# Patient Record
Sex: FEMALE | Race: WHITE | Employment: UNEMPLOYED | URBAN - METROPOLITAN AREA
[De-identification: names, ages, dates, MRNs, and addresses within clinical notes are randomized per-mention and may not be internally consistent; named-entity substitution may affect disease eponyms.]

---

## 2018-10-31 LAB
C. TRACHOMATIS, EXTERNAL RESULT: NEGATIVE
N. GONORRHOEAE, EXTERNAL RESULT: NEGATIVE

## 2018-12-12 LAB
ABO, EXTERNAL RESULT: NORMAL
HEP B, EXTERNAL RESULT: NEGATIVE
HIV, EXTERNAL RESULT: NORMAL
RHOGAM, EXTERNAL RESULT: POSITIVE
RPR, EXTERNAL RESULT: NORMAL
RUBELLA TITER, EXTERNAL RESULT: NORMAL

## 2019-06-06 LAB — GBS, EXTERNAL RESULT: NEGATIVE

## 2019-06-27 ENCOUNTER — ANESTHESIA EVENT (OUTPATIENT)
Dept: LABOR AND DELIVERY | Age: 34
End: 2019-06-27
Payer: COMMERCIAL

## 2019-06-27 ENCOUNTER — HOSPITAL ENCOUNTER (INPATIENT)
Age: 34
LOS: 2 days | Discharge: HOME | End: 2019-06-29
Attending: OBSTETRICS & GYNECOLOGY | Admitting: OBSTETRICS & GYNECOLOGY
Payer: COMMERCIAL

## 2019-06-27 ENCOUNTER — ANESTHESIA (OUTPATIENT)
Dept: LABOR AND DELIVERY | Age: 34
End: 2019-06-27
Payer: COMMERCIAL

## 2019-06-27 VITALS — DIASTOLIC BLOOD PRESSURE: 60 MMHG | SYSTOLIC BLOOD PRESSURE: 105 MMHG | OXYGEN SATURATION: 100 %

## 2019-06-27 LAB
ABO/RH: NORMAL
AMPHETAMINE SCREEN, URINE: NORMAL
ANTIBODY SCREEN: NORMAL
BARBITURATE SCREEN URINE: NORMAL
BASOPHILS ABSOLUTE: 0 K/UL (ref 0–0.2)
BASOPHILS RELATIVE PERCENT: 0.4 %
BENZODIAZEPINE SCREEN, URINE: NORMAL
BUPRENORPHINE URINE: NORMAL
CANNABINOID SCREEN URINE: NORMAL
COCAINE METABOLITE SCREEN URINE: NORMAL
EOSINOPHILS ABSOLUTE: 0.1 K/UL (ref 0–0.6)
EOSINOPHILS RELATIVE PERCENT: 1.5 %
HCT VFR BLD CALC: 38.4 % (ref 36–48)
HEMOGLOBIN: 13.2 G/DL (ref 12–16)
LYMPHOCYTES ABSOLUTE: 1.6 K/UL (ref 1–5.1)
LYMPHOCYTES RELATIVE PERCENT: 16.8 %
Lab: NORMAL
MCH RBC QN AUTO: 28.4 PG (ref 26–34)
MCHC RBC AUTO-ENTMCNC: 34.2 G/DL (ref 31–36)
MCV RBC AUTO: 83 FL (ref 80–100)
METHADONE SCREEN, URINE: NORMAL
MONOCYTES ABSOLUTE: 0.6 K/UL (ref 0–1.3)
MONOCYTES RELATIVE PERCENT: 6.7 %
NEUTROPHILS ABSOLUTE: 6.9 K/UL (ref 1.7–7.7)
NEUTROPHILS RELATIVE PERCENT: 74.6 %
OPIATE SCREEN URINE: NORMAL
OXYCODONE URINE: NORMAL
PDW BLD-RTO: 15.1 % (ref 12.4–15.4)
PH UA: 7
PHENCYCLIDINE SCREEN URINE: NORMAL
PLATELET # BLD: 209 K/UL (ref 135–450)
PMV BLD AUTO: 8.2 FL (ref 5–10.5)
PROPOXYPHENE SCREEN: NORMAL
RBC # BLD: 4.63 M/UL (ref 4–5.2)
TOTAL SYPHILLIS IGG/IGM: NORMAL
WBC # BLD: 9.3 K/UL (ref 4–11)

## 2019-06-27 PROCEDURE — 6360000002 HC RX W HCPCS: Performed by: NURSE ANESTHETIST, CERTIFIED REGISTERED

## 2019-06-27 PROCEDURE — 6360000002 HC RX W HCPCS: Performed by: OBSTETRICS & GYNECOLOGY

## 2019-06-27 PROCEDURE — 86901 BLOOD TYPING SEROLOGIC RH(D): CPT

## 2019-06-27 PROCEDURE — 2580000003 HC RX 258: Performed by: OBSTETRICS & GYNECOLOGY

## 2019-06-27 PROCEDURE — 3700000001 HC ADD 15 MINUTES (ANESTHESIA): Performed by: OBSTETRICS & GYNECOLOGY

## 2019-06-27 PROCEDURE — 80307 DRUG TEST PRSMV CHEM ANLYZR: CPT

## 2019-06-27 PROCEDURE — 86780 TREPONEMA PALLIDUM: CPT

## 2019-06-27 PROCEDURE — 86850 RBC ANTIBODY SCREEN: CPT

## 2019-06-27 PROCEDURE — 1220000000 HC SEMI PRIVATE OB R&B

## 2019-06-27 PROCEDURE — 2500000003 HC RX 250 WO HCPCS: Performed by: NURSE ANESTHETIST, CERTIFIED REGISTERED

## 2019-06-27 PROCEDURE — 7100000001 HC PACU RECOVERY - ADDTL 15 MIN: Performed by: OBSTETRICS & GYNECOLOGY

## 2019-06-27 PROCEDURE — 3700000000 HC ANESTHESIA ATTENDED CARE: Performed by: OBSTETRICS & GYNECOLOGY

## 2019-06-27 PROCEDURE — 86900 BLOOD TYPING SEROLOGIC ABO: CPT

## 2019-06-27 PROCEDURE — 7100000000 HC PACU RECOVERY - FIRST 15 MIN: Performed by: OBSTETRICS & GYNECOLOGY

## 2019-06-27 PROCEDURE — 85025 COMPLETE CBC W/AUTO DIFF WBC: CPT

## 2019-06-27 PROCEDURE — 2709999900 HC NON-CHARGEABLE SUPPLY: Performed by: OBSTETRICS & GYNECOLOGY

## 2019-06-27 PROCEDURE — 3609079900 HC CESAREAN SECTION: Performed by: OBSTETRICS & GYNECOLOGY

## 2019-06-27 RX ORDER — LANOLIN 100 %
OINTMENT (GRAM) TOPICAL
Status: DISCONTINUED | OUTPATIENT
Start: 2019-06-27 | End: 2019-06-29 | Stop reason: HOSPADM

## 2019-06-27 RX ORDER — IBUPROFEN 800 MG/1
800 TABLET ORAL EVERY 6 HOURS PRN
Status: DISCONTINUED | OUTPATIENT
Start: 2019-06-27 | End: 2019-06-29 | Stop reason: HOSPADM

## 2019-06-27 RX ORDER — MORPHINE SULFATE 10 MG/ML
INJECTION, SOLUTION INTRAMUSCULAR; INTRAVENOUS PRN
Status: DISCONTINUED | OUTPATIENT
Start: 2019-06-27 | End: 2019-06-27 | Stop reason: SDUPTHER

## 2019-06-27 RX ORDER — DOCUSATE SODIUM 100 MG/1
100 CAPSULE, LIQUID FILLED ORAL 2 TIMES DAILY PRN
Status: DISCONTINUED | OUTPATIENT
Start: 2019-06-27 | End: 2019-06-29 | Stop reason: HOSPADM

## 2019-06-27 RX ORDER — PRENATAL WITH FERROUS FUM AND FOLIC ACID 3080; 920; 120; 400; 22; 1.84; 3; 20; 10; 1; 12; 200; 27; 25; 2 [IU]/1; [IU]/1; MG/1; [IU]/1; MG/1; MG/1; MG/1; MG/1; MG/1; MG/1; UG/1; MG/1; MG/1; MG/1; MG/1
1 TABLET ORAL DAILY
Status: DISCONTINUED | OUTPATIENT
Start: 2019-06-27 | End: 2019-06-29 | Stop reason: HOSPADM

## 2019-06-27 RX ORDER — MORPHINE SULFATE 0.5 MG/ML
INJECTION, SOLUTION EPIDURAL; INTRATHECAL; INTRAVENOUS
Status: DISCONTINUED
Start: 2019-06-27 | End: 2019-06-27

## 2019-06-27 RX ORDER — ONDANSETRON 2 MG/ML
INJECTION INTRAMUSCULAR; INTRAVENOUS
Status: COMPLETED
Start: 2019-06-27 | End: 2019-06-27

## 2019-06-27 RX ORDER — SIMETHICONE 80 MG
80 TABLET,CHEWABLE ORAL EVERY 6 HOURS PRN
Status: DISCONTINUED | OUTPATIENT
Start: 2019-06-27 | End: 2019-06-29 | Stop reason: HOSPADM

## 2019-06-27 RX ORDER — ACETAMINOPHEN 325 MG/1
650 TABLET ORAL EVERY 4 HOURS PRN
Status: DISCONTINUED | OUTPATIENT
Start: 2019-06-27 | End: 2019-06-29 | Stop reason: HOSPADM

## 2019-06-27 RX ORDER — ONDANSETRON 2 MG/ML
INJECTION INTRAMUSCULAR; INTRAVENOUS PRN
Status: DISCONTINUED | OUTPATIENT
Start: 2019-06-27 | End: 2019-06-27 | Stop reason: SDUPTHER

## 2019-06-27 RX ORDER — OXYTOCIN 10 [USP'U]/ML
INJECTION, SOLUTION INTRAMUSCULAR; INTRAVENOUS PRN
Status: DISCONTINUED | OUTPATIENT
Start: 2019-06-27 | End: 2019-06-27 | Stop reason: SDUPTHER

## 2019-06-27 RX ORDER — CEFAZOLIN SODIUM 1 G/3ML
INJECTION, POWDER, FOR SOLUTION INTRAMUSCULAR; INTRAVENOUS PRN
Status: DISCONTINUED | OUTPATIENT
Start: 2019-06-27 | End: 2019-06-27 | Stop reason: SDUPTHER

## 2019-06-27 RX ORDER — SODIUM CHLORIDE 0.9 % (FLUSH) 0.9 %
10 SYRINGE (ML) INJECTION EVERY 12 HOURS SCHEDULED
Status: DISCONTINUED | OUTPATIENT
Start: 2019-06-27 | End: 2019-06-29 | Stop reason: HOSPADM

## 2019-06-27 RX ORDER — METHYLERGONOVINE MALEATE 0.2 MG/ML
200 INJECTION INTRAVENOUS PRN
Status: DISCONTINUED | OUTPATIENT
Start: 2019-06-27 | End: 2019-06-29 | Stop reason: HOSPADM

## 2019-06-27 RX ORDER — DIPHENHYDRAMINE HYDROCHLORIDE 50 MG/ML
25 INJECTION INTRAMUSCULAR; INTRAVENOUS EVERY 6 HOURS PRN
Status: DISCONTINUED | OUTPATIENT
Start: 2019-06-27 | End: 2019-06-29 | Stop reason: HOSPADM

## 2019-06-27 RX ORDER — OXYCODONE HYDROCHLORIDE AND ACETAMINOPHEN 5; 325 MG/1; MG/1
1 TABLET ORAL EVERY 4 HOURS PRN
Status: DISCONTINUED | OUTPATIENT
Start: 2019-06-27 | End: 2019-06-29 | Stop reason: HOSPADM

## 2019-06-27 RX ORDER — SODIUM CHLORIDE, SODIUM LACTATE, POTASSIUM CHLORIDE, AND CALCIUM CHLORIDE .6; .31; .03; .02 G/100ML; G/100ML; G/100ML; G/100ML
1000 INJECTION, SOLUTION INTRAVENOUS ONCE
Status: COMPLETED | OUTPATIENT
Start: 2019-06-27 | End: 2019-06-27

## 2019-06-27 RX ORDER — OXYCODONE HYDROCHLORIDE AND ACETAMINOPHEN 5; 325 MG/1; MG/1
2 TABLET ORAL EVERY 4 HOURS PRN
Status: DISCONTINUED | OUTPATIENT
Start: 2019-06-27 | End: 2019-06-29 | Stop reason: HOSPADM

## 2019-06-27 RX ORDER — SODIUM CHLORIDE 0.9 % (FLUSH) 0.9 %
10 SYRINGE (ML) INJECTION PRN
Status: DISCONTINUED | OUTPATIENT
Start: 2019-06-27 | End: 2019-06-29 | Stop reason: HOSPADM

## 2019-06-27 RX ORDER — KETOROLAC TROMETHAMINE 30 MG/ML
30 INJECTION, SOLUTION INTRAMUSCULAR; INTRAVENOUS EVERY 6 HOURS
Status: DISPENSED | OUTPATIENT
Start: 2019-06-27 | End: 2019-06-28

## 2019-06-27 RX ORDER — FERROUS SULFATE 325(65) MG
325 TABLET ORAL 2 TIMES DAILY WITH MEALS
Status: DISCONTINUED | OUTPATIENT
Start: 2019-06-27 | End: 2019-06-29 | Stop reason: HOSPADM

## 2019-06-27 RX ORDER — ONDANSETRON 2 MG/ML
4 INJECTION INTRAMUSCULAR; INTRAVENOUS EVERY 6 HOURS PRN
Status: DISCONTINUED | OUTPATIENT
Start: 2019-06-27 | End: 2019-06-29 | Stop reason: HOSPADM

## 2019-06-27 RX ORDER — SODIUM CHLORIDE, SODIUM LACTATE, POTASSIUM CHLORIDE, CALCIUM CHLORIDE 600; 310; 30; 20 MG/100ML; MG/100ML; MG/100ML; MG/100ML
INJECTION, SOLUTION INTRAVENOUS CONTINUOUS
Status: DISCONTINUED | OUTPATIENT
Start: 2019-06-27 | End: 2019-06-29 | Stop reason: HOSPADM

## 2019-06-27 RX ORDER — EPHEDRINE SULFATE 50 MG/ML
INJECTION, SOLUTION INTRAVENOUS PRN
Status: DISCONTINUED | OUTPATIENT
Start: 2019-06-27 | End: 2019-06-27 | Stop reason: SDUPTHER

## 2019-06-27 RX ADMIN — SODIUM CHLORIDE, POTASSIUM CHLORIDE, SODIUM LACTATE AND CALCIUM CHLORIDE: 600; 310; 30; 20 INJECTION, SOLUTION INTRAVENOUS at 10:50

## 2019-06-27 RX ADMIN — FAMOTIDINE 20 MG: 10 INJECTION, SOLUTION INTRAVENOUS at 10:00

## 2019-06-27 RX ADMIN — OXYTOCIN 20 UNITS: 10 INJECTION INTRAVENOUS at 11:29

## 2019-06-27 RX ADMIN — SODIUM CHLORIDE, POTASSIUM CHLORIDE, SODIUM LACTATE AND CALCIUM CHLORIDE: 600; 310; 30; 20 INJECTION, SOLUTION INTRAVENOUS at 09:00

## 2019-06-27 RX ADMIN — Medication 2 G: at 10:45

## 2019-06-27 RX ADMIN — SODIUM CHLORIDE, POTASSIUM CHLORIDE, SODIUM LACTATE AND CALCIUM CHLORIDE 1000 ML: 600; 310; 30; 20 INJECTION, SOLUTION INTRAVENOUS at 10:46

## 2019-06-27 RX ADMIN — ONDANSETRON 4 MG: 2 INJECTION INTRAMUSCULAR; INTRAVENOUS at 10:00

## 2019-06-27 RX ADMIN — CEFAZOLIN 2000 MG: 1 INJECTION, POWDER, FOR SOLUTION INTRAMUSCULAR; INTRAVENOUS at 11:03

## 2019-06-27 RX ADMIN — EPHEDRINE SULFATE 10 MG: 50 INJECTION INTRAMUSCULAR; INTRAVENOUS; SUBCUTANEOUS at 11:20

## 2019-06-27 RX ADMIN — SODIUM CHLORIDE, POTASSIUM CHLORIDE, SODIUM LACTATE AND CALCIUM CHLORIDE: 600; 310; 30; 20 INJECTION, SOLUTION INTRAVENOUS at 11:21

## 2019-06-27 RX ADMIN — MORPHINE SULFATE 0.15 MG: 10 INJECTION, SOLUTION INTRAMUSCULAR; INTRAVENOUS at 11:05

## 2019-06-27 ASSESSMENT — PULMONARY FUNCTION TESTS
PIF_VALUE: 0

## 2019-06-27 ASSESSMENT — PAIN - FUNCTIONAL ASSESSMENT: PAIN_FUNCTIONAL_ASSESSMENT: 0-10

## 2019-06-27 NOTE — ANESTHESIA PRE PROCEDURE
injection    PRN Cha ANAT Lama - CRNA   20 Units at 06/27/19 1129       Allergies: Allergies   Allergen Reactions    Penicillins Other (See Comments)     Patient's sister is anaphylactic to PCN ATBs, Pt has never taken any out of fear. Problem List:  There is no problem list on file for this patient. Past Medical History:  History reviewed. No pertinent past medical history. Past Surgical History:        Procedure Laterality Date    APPENDECTOMY      BACK SURGERY      CHOLECYSTECTOMY      FRACTURE SURGERY         Social History:    Social History     Tobacco Use    Smoking status: Never Smoker    Smokeless tobacco: Never Used   Substance Use Topics    Alcohol use: No                                Counseling given: Not Answered      Vital Signs (Current):   Vitals:    06/27/19 0916 06/27/19 1005 06/27/19 1015   BP: 116/66 116/66    Pulse: 81 81    Resp: 16     Temp: 36.8 °C (98.3 °F)     TempSrc: Oral     Weight:   150 lb (68 kg)   Height:   5' 3\" (1.6 m)                                              BP Readings from Last 3 Encounters:   06/27/19 116/66   06/27/19 (!) 107/57       NPO Status: Time of last liquid consumption: 2300                        Time of last solid consumption: 2300                        Date of last liquid consumption: 06/26/19                        Date of last solid food consumption: 06/26/19    BMI:   Wt Readings from Last 3 Encounters:   06/27/19 150 lb (68 kg)     Body mass index is 26.57 kg/m².     CBC:   Lab Results   Component Value Date    WBC 9.3 06/27/2019    RBC 4.63 06/27/2019    HGB 13.2 06/27/2019    HCT 38.4 06/27/2019    MCV 83.0 06/27/2019    RDW 15.1 06/27/2019     06/27/2019       CMP:   Lab Results   Component Value Date     06/01/2015    K 3.2 06/01/2015    CL 98 06/01/2015    CO2 22 06/01/2015    BUN 12 06/01/2015    CREATININE 0.8 06/01/2015    GFRAA >60 06/01/2015    AGRATIO 1.4 06/01/2015    LABGLOM >60 06/01/2015    GLUCOSE

## 2019-06-28 LAB
HCT VFR BLD CALC: 32.7 % (ref 36–48)
HEMOGLOBIN: 10.9 G/DL (ref 12–16)
MCH RBC QN AUTO: 28.2 PG (ref 26–34)
MCHC RBC AUTO-ENTMCNC: 33.5 G/DL (ref 31–36)
MCV RBC AUTO: 84.1 FL (ref 80–100)
PDW BLD-RTO: 14.6 % (ref 12.4–15.4)
PLATELET # BLD: 185 K/UL (ref 135–450)
PMV BLD AUTO: 7.5 FL (ref 5–10.5)
RBC # BLD: 3.88 M/UL (ref 4–5.2)
WBC # BLD: 10.1 K/UL (ref 4–11)

## 2019-06-28 PROCEDURE — 36415 COLL VENOUS BLD VENIPUNCTURE: CPT

## 2019-06-28 PROCEDURE — 85027 COMPLETE CBC AUTOMATED: CPT

## 2019-06-28 PROCEDURE — 1220000000 HC SEMI PRIVATE OB R&B

## 2019-06-28 PROCEDURE — 2580000003 HC RX 258: Performed by: OBSTETRICS & GYNECOLOGY

## 2019-06-28 PROCEDURE — 6370000000 HC RX 637 (ALT 250 FOR IP): Performed by: OBSTETRICS & GYNECOLOGY

## 2019-06-28 RX ADMIN — IBUPROFEN 800 MG: 800 TABLET, FILM COATED ORAL at 02:24

## 2019-06-28 RX ADMIN — Medication 10 ML: at 09:11

## 2019-06-28 RX ADMIN — ACETAMINOPHEN 650 MG: 325 TABLET ORAL at 14:25

## 2019-06-28 RX ADMIN — Medication 10 ML: at 02:25

## 2019-06-28 RX ADMIN — Medication 1 TABLET: at 09:11

## 2019-06-28 RX ADMIN — IBUPROFEN 800 MG: 800 TABLET, FILM COATED ORAL at 09:11

## 2019-06-28 RX ADMIN — SIMETHICONE 80 MG: 80 TABLET, CHEWABLE ORAL at 02:37

## 2019-06-28 RX ADMIN — DOCUSATE SODIUM 100 MG: 100 CAPSULE, LIQUID FILLED ORAL at 02:24

## 2019-06-28 RX ADMIN — IBUPROFEN 800 MG: 800 TABLET, FILM COATED ORAL at 18:51

## 2019-06-28 RX ADMIN — SIMETHICONE 80 MG: 80 TABLET, CHEWABLE ORAL at 19:19

## 2019-06-28 ASSESSMENT — PAIN SCALES - GENERAL
PAINLEVEL_OUTOF10: 3
PAINLEVEL_OUTOF10: 5
PAINLEVEL_OUTOF10: 5
PAINLEVEL_OUTOF10: 4

## 2019-06-28 NOTE — LACTATION NOTE
This note was copied from a baby's chart. Introduced self as lactation RN for this shift. Name and number written on board. Mother concerned that she is not able to hand express drops of colostrum from breast. Mother breast fed her first child without any complications. Reassured mother that colostrum is thick and that it is sometimes difficult to hand express but infant is able to draw it out easier. Mother reassured. No other questions or concerns at this time. Encouraged to call prn for f/u.

## 2019-06-28 NOTE — PROGRESS NOTES
Subjective:     Postpartum Day 1:  Delivery    The patient feels well. The patient denies emotional concerns. Pain is well controlled with current medications. The baby iswell. Baby is feeding via breast. Urinary output is adequate. The patient is ambulating well. The patient is tolerating a normal diet. Objective:    VITALS:  BP (!) 95/55   Pulse 81   Temp 98.4 °F (36.9 °C) (Oral)   Resp 16   Ht 5' 3\" (1.6 m)   Wt 150 lb (68 kg)   LMP  (Approximate)   SpO2 98%   Breastfeeding? Unknown   BMI 26.57 kg/m²     Vitals:    19 0911   BP: (!) 95/55   Pulse: 81   Resp: 16   Temp: 98.4 °F (36.9 °C)   SpO2:          General:    alert, appears stated age and cooperative   Bowel Sounds:  active   Lochia:  appropriate   Uterine Fundus:   firm   Incision: Dressing in place , dry    DVT Evaluation:  No evidence of DVT seen on physical exam.     CBC   Lab Results   Component Value Date    WBC 10.1 2019    HGB 10.9 (L) 2019    HCT 32.7 (L) 2019    MCV 84.1 2019     2019        Assessment:     Status post  section. Doing well postoperatively. Plan:     Continue current care.     Chrissy Be MD

## 2019-06-28 NOTE — LACTATION NOTE
This note was copied from a baby's chart. Lactation Progress Note      Data:   Pt called requesting lactation to assess latch. Action: Infant STS with mom in cross cradle position and mother independently latched infant on to breast. Latch appeared to be slightly shallow and chin gently pulled down as infant breast fed. Mother states latch feels better and observed ALEXANDRA, SRS and many AS. Encouraged mother to gently pull chin down or have , if latch does not feel deep and hurts. Clicking noted intermittently with feed. Encouraged deeper latch to help with clicking as well. Response: Pleased with feeding. Encouraged to call prn for f/u.

## 2019-06-29 VITALS
WEIGHT: 150 LBS | RESPIRATION RATE: 18 BRPM | TEMPERATURE: 97.8 F | OXYGEN SATURATION: 98 % | SYSTOLIC BLOOD PRESSURE: 104 MMHG | DIASTOLIC BLOOD PRESSURE: 54 MMHG | HEIGHT: 63 IN | BODY MASS INDEX: 26.58 KG/M2 | HEART RATE: 69 BPM

## 2019-06-29 PROCEDURE — 2580000003 HC RX 258: Performed by: OBSTETRICS & GYNECOLOGY

## 2019-06-29 PROCEDURE — 6370000000 HC RX 637 (ALT 250 FOR IP): Performed by: OBSTETRICS & GYNECOLOGY

## 2019-06-29 RX ORDER — OXYCODONE HYDROCHLORIDE AND ACETAMINOPHEN 5; 325 MG/1; MG/1
1 TABLET ORAL EVERY 4 HOURS PRN
Qty: 8 TABLET | Refills: 0 | Status: SHIPPED | OUTPATIENT
Start: 2019-06-29 | End: 2019-07-02

## 2019-06-29 RX ORDER — IBUPROFEN 800 MG/1
800 TABLET ORAL EVERY 8 HOURS PRN
Qty: 25 TABLET | Refills: 1 | Status: SHIPPED | OUTPATIENT
Start: 2019-06-29

## 2019-06-29 RX ADMIN — Medication 10 ML: at 00:04

## 2019-06-29 RX ADMIN — IBUPROFEN 800 MG: 800 TABLET, FILM COATED ORAL at 00:04

## 2019-06-29 RX ADMIN — DOCUSATE SODIUM 100 MG: 100 CAPSULE, LIQUID FILLED ORAL at 00:04

## 2019-06-29 RX ADMIN — IBUPROFEN 800 MG: 800 TABLET, FILM COATED ORAL at 08:06

## 2019-06-29 RX ADMIN — DOCUSATE SODIUM 100 MG: 100 CAPSULE, LIQUID FILLED ORAL at 08:06

## 2019-06-29 ASSESSMENT — PAIN SCALES - GENERAL
PAINLEVEL_OUTOF10: 5
PAINLEVEL_OUTOF10: 5

## 2019-06-29 NOTE — FLOWSHEET NOTE
Postpartum and infant care teaching completed and forms signed by patient. Copy witnessed by RN and given to patient. Patient verbalized understanding of all teaching points and denies further questions. Prescriptions from Dr. Luz Espinosa  given. Patient plans to follow-up with [facility] Provider as instructed. ID bands checked. Mother's ID band and one of baby's ID bands removed and taped to discharge instruction sheet, signed by patient and witnessed by RN. Patient discharged home in stable condition accompanied by fob. Discharged via wheelchair, holding baby in a rear facing car seat.

## 2019-06-29 NOTE — FLOWSHEET NOTE
Discharge Phone Call Log  Patient Name: Tuyet Arechiga     [facility] Care Provider: John Madrigal MD Discharge Date: 2019    Disposition of baby:    Phone Number: [phone] (home)     Attempts to Contact:  Date:    Nurse  Date:    Nurse  Date:    Nurse    Introduce yourself and explain the questionnaire. 1.  Now that you are at home is your pain being well controlled? Y/N   What pain reducing measures are you using? ____________________________________        Information for the patient's :  Elizabeth Yoon [[id number]]   Delivery Method: , Low Transverse    2. Are you having any infant feeding issues? Y/N _____________________________      If breastfeeding, were you satisfied with the breastfeeding support services offered? Y/N  3. Any engorgement problems? Y/N  Have you had to supplement? Y/N  4. Have you made or have you already had your first appointment with the baby's doctor? Y/N If no, do you know when to schedule it? Y/N Do you have a safe crib, bassinet or play yard with a firm mattress for your infant to sleep in at home? Y/N  5. Have you scheduled your follow-up appointment? Y/N  If no, do you know when to schedule it? Y/N  6. Did your nurses and physicians include you in the plan of care, communicating with      you respectfully, and in a manner easy to understand? Y/N       Comments:  ___________________________________________________________  7. Is there anyone in particular you would like to mention who provided care for you?          ____________________________    8. Do you have any questions about your discharge instructions or the notebook? Y/N    9. Did your discharge occur in a timely manner? Y/N        Comments: __________________________________________________________    Remember to describe the hospital survey and ask patient to return it when possible.   Questions During Interview:__________________________________________________________  Teaching

## 2021-03-30 ENCOUNTER — IMMUNIZATION (OUTPATIENT)
Dept: PRIMARY CARE CLINIC | Age: 36
End: 2021-03-30
Payer: COMMERCIAL

## 2021-03-30 PROCEDURE — 0001A COVID-19, PFIZER VACCINE 30MCG/0.3ML DOSE: CPT | Performed by: FAMILY MEDICINE

## 2021-03-30 PROCEDURE — 91300 COVID-19, PFIZER VACCINE 30MCG/0.3ML DOSE: CPT | Performed by: FAMILY MEDICINE

## 2021-04-20 ENCOUNTER — IMMUNIZATION (OUTPATIENT)
Dept: PRIMARY CARE CLINIC | Age: 36
End: 2021-04-20
Payer: COMMERCIAL

## 2021-04-20 PROCEDURE — 91300 COVID-19, PFIZER VACCINE 30MCG/0.3ML DOSE: CPT | Performed by: FAMILY MEDICINE

## 2021-04-20 PROCEDURE — 0002A COVID-19, PFIZER VACCINE 30MCG/0.3ML DOSE: CPT | Performed by: FAMILY MEDICINE

## 2021-10-14 ENCOUNTER — HOSPITAL ENCOUNTER (OUTPATIENT)
Dept: GENERAL RADIOLOGY | Age: 36
Discharge: HOME | End: 2021-10-14
Payer: COMMERCIAL

## 2021-10-14 DIAGNOSIS — R13.10 DYSPHAGIA, UNSPECIFIED TYPE: ICD-10-CM

## 2021-10-14 PROCEDURE — 74220 X-RAY XM ESOPHAGUS 1CNTRST: CPT

## 2022-08-06 ENCOUNTER — APPOINTMENT (OUTPATIENT)
Dept: CT IMAGING | Age: 37
End: 2022-08-06
Payer: COMMERCIAL

## 2022-08-06 ENCOUNTER — HOSPITAL ENCOUNTER (EMERGENCY)
Age: 37
Discharge: HOME | End: 2022-08-06
Attending: EMERGENCY MEDICINE
Payer: COMMERCIAL

## 2022-08-06 VITALS
BODY MASS INDEX: 20.38 KG/M2 | TEMPERATURE: 97.9 F | RESPIRATION RATE: 16 BRPM | HEART RATE: 70 BPM | OXYGEN SATURATION: 100 % | SYSTOLIC BLOOD PRESSURE: 107 MMHG | WEIGHT: 115 LBS | DIASTOLIC BLOOD PRESSURE: 66 MMHG | HEIGHT: 63 IN

## 2022-08-06 DIAGNOSIS — R51.9 NONINTRACTABLE HEADACHE, UNSPECIFIED CHRONICITY PATTERN, UNSPECIFIED HEADACHE TYPE: Primary | ICD-10-CM

## 2022-08-06 LAB
A/G RATIO: 1.8 (ref 1.1–2.2)
ALBUMIN SERPL-MCNC: 5 G/DL (ref 3.4–5)
ALP BLD-CCNC: 40 U/L (ref 40–129)
ALT SERPL-CCNC: 14 U/L (ref 10–40)
ANION GAP SERPL CALCULATED.3IONS-SCNC: 9 MMOL/L (ref 3–16)
AST SERPL-CCNC: 21 U/L (ref 15–37)
BASOPHILS ABSOLUTE: 0 K/UL (ref 0–0.2)
BASOPHILS RELATIVE PERCENT: 0.9 %
BILIRUB SERPL-MCNC: <0.2 MG/DL (ref 0–1)
BILIRUBIN URINE: NEGATIVE
BLOOD, URINE: NEGATIVE
BUN BLDV-MCNC: 15 MG/DL (ref 7–20)
CALCIUM SERPL-MCNC: 9.4 MG/DL (ref 8.3–10.6)
CHLORIDE BLD-SCNC: 103 MMOL/L (ref 99–110)
CLARITY: CLEAR
CO2: 25 MMOL/L (ref 21–32)
COLOR: NORMAL
CREAT SERPL-MCNC: 0.8 MG/DL (ref 0.6–1.1)
EOSINOPHILS ABSOLUTE: 0.3 K/UL (ref 0–0.6)
EOSINOPHILS RELATIVE PERCENT: 4.6 %
GFR AFRICAN AMERICAN: >60
GFR NON-AFRICAN AMERICAN: >60
GLUCOSE BLD-MCNC: 90 MG/DL (ref 70–99)
GLUCOSE URINE: NEGATIVE MG/DL
HCG(URINE) PREGNANCY TEST: NEGATIVE
HCT VFR BLD CALC: 37.4 % (ref 36–48)
HEMOGLOBIN: 12.1 G/DL (ref 12–16)
KETONES, URINE: NEGATIVE MG/DL
LEUKOCYTE ESTERASE, URINE: NEGATIVE
LYMPHOCYTES ABSOLUTE: 1.9 K/UL (ref 1–5.1)
LYMPHOCYTES RELATIVE PERCENT: 35 %
MCH RBC QN AUTO: 26.7 PG (ref 26–34)
MCHC RBC AUTO-ENTMCNC: 32.5 G/DL (ref 31–36)
MCV RBC AUTO: 82.2 FL (ref 80–100)
MICROSCOPIC EXAMINATION: NORMAL
MONOCYTES ABSOLUTE: 0.3 K/UL (ref 0–1.3)
MONOCYTES RELATIVE PERCENT: 4.9 %
NEUTROPHILS ABSOLUTE: 3 K/UL (ref 1.7–7.7)
NEUTROPHILS RELATIVE PERCENT: 54.6 %
NITRITE, URINE: NEGATIVE
PDW BLD-RTO: 14.5 % (ref 12.4–15.4)
PH UA: 5.5 (ref 5–8)
PLATELET # BLD: 270 K/UL (ref 135–450)
PLATELET SLIDE REVIEW: NORMAL
PMV BLD AUTO: 8.1 FL (ref 5–10.5)
POTASSIUM REFLEX MAGNESIUM: 4.1 MMOL/L (ref 3.5–5.1)
PROTEIN UA: NEGATIVE MG/DL
RBC # BLD: 4.55 M/UL (ref 4–5.2)
SLIDE REVIEW: NORMAL
SODIUM BLD-SCNC: 137 MMOL/L (ref 136–145)
SPECIFIC GRAVITY UA: <=1.005 (ref 1–1.03)
TOTAL PROTEIN: 7.8 G/DL (ref 6.4–8.2)
URINE TYPE: NORMAL
UROBILINOGEN, URINE: 0.2 E.U./DL
WBC # BLD: 5.5 K/UL (ref 4–11)

## 2022-08-06 PROCEDURE — 6360000002 HC RX W HCPCS: Performed by: EMERGENCY MEDICINE

## 2022-08-06 PROCEDURE — 81003 URINALYSIS AUTO W/O SCOPE: CPT

## 2022-08-06 PROCEDURE — 99285 EMERGENCY DEPT VISIT HI MDM: CPT

## 2022-08-06 PROCEDURE — 2580000003 HC RX 258: Performed by: EMERGENCY MEDICINE

## 2022-08-06 PROCEDURE — 96375 TX/PRO/DX INJ NEW DRUG ADDON: CPT

## 2022-08-06 PROCEDURE — 85025 COMPLETE CBC W/AUTO DIFF WBC: CPT

## 2022-08-06 PROCEDURE — 6360000004 HC RX CONTRAST MEDICATION: Performed by: EMERGENCY MEDICINE

## 2022-08-06 PROCEDURE — 84703 CHORIONIC GONADOTROPIN ASSAY: CPT

## 2022-08-06 PROCEDURE — 96374 THER/PROPH/DIAG INJ IV PUSH: CPT

## 2022-08-06 PROCEDURE — 70450 CT HEAD/BRAIN W/O DYE: CPT

## 2022-08-06 PROCEDURE — 80053 COMPREHEN METABOLIC PANEL: CPT

## 2022-08-06 PROCEDURE — 70496 CT ANGIOGRAPHY HEAD: CPT

## 2022-08-06 RX ORDER — SUMATRIPTAN 50 MG/1
50 TABLET, FILM COATED ORAL
Qty: 9 TABLET | Refills: 0 | Status: SHIPPED | OUTPATIENT
Start: 2022-08-06 | End: 2022-08-06

## 2022-08-06 RX ORDER — KETOROLAC TROMETHAMINE 30 MG/ML
15 INJECTION, SOLUTION INTRAMUSCULAR; INTRAVENOUS ONCE
Status: COMPLETED | OUTPATIENT
Start: 2022-08-06 | End: 2022-08-06

## 2022-08-06 RX ORDER — DIPHENHYDRAMINE HYDROCHLORIDE 50 MG/ML
25 INJECTION INTRAMUSCULAR; INTRAVENOUS ONCE
Status: COMPLETED | OUTPATIENT
Start: 2022-08-06 | End: 2022-08-06

## 2022-08-06 RX ORDER — METOCLOPRAMIDE HYDROCHLORIDE 5 MG/ML
10 INJECTION INTRAMUSCULAR; INTRAVENOUS ONCE
Status: COMPLETED | OUTPATIENT
Start: 2022-08-06 | End: 2022-08-06

## 2022-08-06 RX ORDER — 0.9 % SODIUM CHLORIDE 0.9 %
1000 INTRAVENOUS SOLUTION INTRAVENOUS ONCE
Status: COMPLETED | OUTPATIENT
Start: 2022-08-06 | End: 2022-08-06

## 2022-08-06 RX ADMIN — IOPAMIDOL 75 ML: 755 INJECTION, SOLUTION INTRAVENOUS at 15:50

## 2022-08-06 RX ADMIN — KETOROLAC TROMETHAMINE 15 MG: 30 INJECTION, SOLUTION INTRAMUSCULAR at 16:02

## 2022-08-06 RX ADMIN — DIPHENHYDRAMINE HYDROCHLORIDE 25 MG: 50 INJECTION, SOLUTION INTRAMUSCULAR; INTRAVENOUS at 16:02

## 2022-08-06 RX ADMIN — SODIUM CHLORIDE 1000 ML: 9 INJECTION, SOLUTION INTRAVENOUS at 16:01

## 2022-08-06 RX ADMIN — METOCLOPRAMIDE HYDROCHLORIDE 10 MG: 5 INJECTION INTRAMUSCULAR; INTRAVENOUS at 16:02

## 2022-08-06 ASSESSMENT — PAIN SCALES - GENERAL
PAINLEVEL_OUTOF10: 5
PAINLEVEL_OUTOF10: 3

## 2022-08-06 ASSESSMENT — PAIN - FUNCTIONAL ASSESSMENT: PAIN_FUNCTIONAL_ASSESSMENT: 0-10

## 2022-08-06 NOTE — ED PROVIDER NOTES
I independently performed a history and physical on Jennifer More. I personally saw the patient and performed a substantive portion of the visit including all aspects of the medical decision making. Briefly, this is a 42 y.o. female here for chief complaint of headache. Started yesterday. Also had tinnitus of the right ear. This morning, headache continued. Started having tongue numbness as well as seeing black spots and left-sided facial numbness of the forehead and upper cheek. She does have history of migraines when she was a child. During this time, she often had these black spots. No nausea or vomiting. No neck stiffness. No fevers or chills. No ear pain. No chest discomfort. On exam,   General: Patient is in no acute distress  Skin: No cyanosis  HEENT: Moist mucous membranes  Heart: Regular rate, regular rhythm  Lung: No respiratory distress  Abdomen: Soft, nontender  Neuro: Moving all extremities, no facial droop, no slurred speech, answers questions appropriately. Cranial nerves II to XII intact. Normal finger-to-nose. Normal strength and sensation bilateral upper and lower extremities. NIH stroke scale 0          Screenings   [place] Coma Scale  Eye Opening: Spontaneous  Best Verbal Response: Oriented  Best Motor Response: Obeys commands  [place] Coma Scale Score: 15        MDM  Briefly, this is a 42 y.o. female here for headache. Doubt meningitis. No neck stiffness or fever or nausea. Most likely migraines that she has had similar black spots in her vision in the past.  We gave migraine cocktail and her headache and numbness have since completely resolved. Stroke within differential but lower suspicion. Did obtain CT head which showed no intracranial bleed. CTA taken which showed possible 1.8 mm aneurysm versus prominent infundibulum. Believe that this is incidental and can be followed up as an outpatient. No signs of intracranial bleed.   Will discharge with recommendation to follow-up with her doctor. Is this patient to be included in the SEP-1 Core Measure due to severe sepsis or septic shock? No   Exclusion criteria - the patient is NOT to be included for SEP-1 Core Measure due to: Infection is not suspected           Patient Referrals:  Yordy Fry MD  [phone]    In 1 day      [address] [address] [phone]  Call in 1 day        Discharge Medications:  New Prescriptions    SUMATRIPTAN (IMITREX) 50 MG TABLET    Take 1 tablet by mouth once as needed for Migraine       FINAL IMPRESSION  1. Nonintractable headache, unspecified chronicity pattern, unspecified headache type        Blood pressure 127/72, pulse 67, temperature 97.9 °F (36.6 °C), temperature source Oral, resp. rate 18, height 5' 3\" (1.6 m), weight 115 lb (52.2 kg), last menstrual period 08/01/2022, SpO2 100 %, unknown if currently breastfeeding. For further details of [facility] encounter, please see documentation by advanced practice providerBennett.        Gera Newton MD  08/06/22 5857

## 2022-08-06 NOTE — ED PROVIDER NOTES
[facility] Emergency Department    CHIEF COMPLAINT  Headache (Pt to ED with c/o headache starting yesterday. Pt reports she also had ringing in right ear. This morning pt woke up with numbness on left side of face, no longer there, just a little numbness in tongue and still had ringing in right ear. Pt also c/o body aches specifically in knees. No N/V. Pt reports yesterday pt had some SOB, no chest pain)      SHARED SERVICE VISIT  I have seen and evaluated this patient with my supervising physician, Dr. Shyanne Calderon. HISTORY OF PRESENT ILLNESS  [facility] 42 [facility] who presents to the ED complaining of headache. Patient states she is been experiencing headache for the past 24 hours. Patient states she woke up this morning with some numbness to the left side of her face as well as some numbness of the tongue which is no longer there. Patient states that she is also been experiencing ringing in her left ear as well. Patient states she is been experiencing some body aches specifically to the knees. Patient denies any nausea or vomiting. Patient denies any photophobia. Patient states she did experience some shortness of breath as well as chest pain yesterday which has resolved. Patient denies any recent vision changes. Patient denies any fevers or chills. Patient denies any current chest pain, shortness of breath, or dyspnea on exertion. Patient denies any vomiting or diarrhea. Patient denies any urinary symptoms. Patient denies any new onset back pain. Patient denies any dental pain. Patient denies any recent travel or sick contacts. No other complaints, modifying factors or associated symptoms. Nursing notes reviewed. History reviewed. No pertinent past medical history.   Past Surgical History:   Procedure Laterality Date    APPENDECTOMY      BACK SURGERY       SECTION N/A 2019     SECTION performed by Luis Armando Simon MD at [facility] OR    CHOLECYSTECTOMY      FRACTURE SURGERY       History reviewed. No pertinent family history. Social History     Socioeconomic History    Marital status:      Spouse name: Not on file    Number of children: Not on file    Years of education: Not on file    Highest education level: Not on file   Occupational History    Not on file   Tobacco Use    Smoking status: Never    Smokeless tobacco: Never   Vaping Use    Vaping Use: Never used   Substance and Sexual Activity    Alcohol use: No    Drug use: Never    Sexual activity: Yes     Partners: Male   Other Topics Concern    Not on file   Social History Narrative    Not on file     Social Determinants of Health     Financial Resource Strain: Not on file   Food Insecurity: Not on file   Transportation Needs: Not on file   Physical Activity: Not on file   Stress: Not on file   Social Connections: Not on file   Intimate Partner Violence: Not on file   Housing Stability: Not on file     No current facility-administered medications for this encounter. Current Outpatient Medications   Medication Sig Dispense Refill    ibuprofen (ADVIL;MOTRIN) 800 MG tablet Take 1 tablet by mouth every 8 hours as needed (Pain level 1 - 10) 25 tablet 1    Prenatal MV-Min-Fe Fum-FA-DHA (PRENATAL 1 PO) Take by mouth       Allergies   Allergen Reactions    Penicillins Other (See Comments)     Patient's sister is anaphylactic to PCN ATBs, Pt has never taken any out of fear. REVIEW OF SYSTEMS  10 systems reviewed, pertinent positives per HPI otherwise noted to be negative    PHYSICAL EXAM  BP (!) 164/95   Pulse 70   Temp 97.9 °F (36.6 °C) (Oral)   Resp 18   Ht 5' 3\" (1.6 m)   Wt 115 lb (52.2 kg)   LMP 08/01/2022   SpO2 100%   BMI 20.37 kg/m²   GENERAL APPEARANCE: Awake and alert. Cooperative. No acute distress. Nontoxic in appearance  HEAD: Normocephalic. Atraumatic. No ontiveros signs or raccoon eyes. EYES: PERRL. EOM's grossly intact.    ENT: Mucous membranes are pink and moist.   NECK: Supple. Full range of motion. No nuchal rigidity. No tracheal tenderness or deviation. No stridor. HEART: RRR. No murmurs rubs or gallops. Normal S1-S2. No S3 or S4.  LUNGS: Respirations unlabored. CTAB. Good air exchange. Speaking comfortably in full sentences. ABDOMEN: Soft. Non-distended. Non-tender. No guarding or rebound. No masses. No organomegaly. EXTREMITIES: No peripheral edema. Moves all extremities equally. All extremities neurovascularly intact. SKIN: Warm and dry. No acute rashes. NEUROLOGICAL: Alert and oriented. CN's 2-12 intact. No focal neurodeficits. No gross facial drooping. Strength 5/5, sensation intact. PSYCHIATRIC: Normal mood and affect. RADIOLOGY  No results found. ED COURSE  75-year-old female presents to the ED for evaluation of a headache as well as some subjective facial numbness. She is been experiencing headache for the past 24 hours and when she woke up this morning she was feeling some numbness on the left side of her face which is since resolved. No abnormalities noted on CBC. No abnormalities noted on CMP. No abnormalities noted on UA. Negative pregnancy test.  CTA head and neck with contrast shows no acute abnormality or flow-limiting stenosis in major arteries of the head and neck. 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 CT head without contrast shows no acute intercranial abnormalities. Patient received Toradol, Reglan, Benadryl for pain, with good relief. Triage vitals /95, pulse 70, respirations 18, temperature 97.9 °F, SPO2 100% on room air. Physical exam findings as well as laboratory and radiology studies suggest non-intractable headache with unspecified chronicity pattern. Risk management discussed and shared decision making had with patient and/or surrogate. All questions were answered.  Patient will follow up with primary care provider for further evaluation/treatment. All questions answered. Patient will return to ED for new/worsening symptoms. Patient was sent home with a prescription for sumatriptan. CRITICAL CARE TIME  0 minutes of critical care time spent not including separately billable procedures.     MDM  Results for orders placed or performed during the hospital encounter of 08/06/22   CBC with Auto Differential   Result Value Ref Range    WBC 5.5 4.0 - 11.0 K/uL    RBC 4.55 4.00 - 5.20 M/uL    Hemoglobin 12.1 12.0 - 16.0 g/dL    Hematocrit 37.4 36.0 - 48.0 %    MCV 82.2 80.0 - 100.0 fL    MCH 26.7 26.0 - 34.0 pg    MCHC 32.5 31.0 - 36.0 g/dL    RDW 14.5 12.4 - 15.4 %    Platelets 021 018 - 492 K/uL    MPV 8.1 5.0 - 10.5 fL    PLATELET SLIDE REVIEW Clumped     SLIDE REVIEW see below     Neutrophils % 54.6 %    Lymphocytes % 35.0 %    Monocytes % 4.9 %    Eosinophils % 4.6 %    Basophils % 0.9 %    Neutrophils Absolute 3.0 1.7 - 7.7 K/uL    Lymphocytes Absolute 1.9 1.0 - 5.1 K/uL    Monocytes Absolute 0.3 0.0 - 1.3 K/uL    Eosinophils Absolute 0.3 0.0 - 0.6 K/uL    Basophils Absolute 0.0 0.0 - 0.2 K/uL   Comprehensive Metabolic Panel w/ Reflex to MG   Result Value Ref Range    Sodium 137 136 - 145 mmol/L    Potassium reflex Magnesium 4.1 3.5 - 5.1 mmol/L    Chloride 103 99 - 110 mmol/L    CO2 25 21 - 32 mmol/L    Anion Gap 9 3 - 16    Glucose 90 70 - 99 mg/dL    BUN 15 7 - 20 mg/dL    Creatinine 0.8 0.6 - 1.1 mg/dL    GFR Non-African American >60 >60    GFR African American >60 >60    Calcium 9.4 8.3 - 10.6 mg/dL    Total Protein 7.8 6.4 - 8.2 g/dL    Albumin 5.0 3.4 - 5.0 g/dL    Albumin/Globulin Ratio 1.8 1.1 - 2.2    Total Bilirubin <0.2 0.0 - 1.0 mg/dL    Alkaline Phosphatase 40 40 - 129 U/L    ALT 14 10 - 40 U/L    AST 21 15 - 37 U/L   Urinalysis   Result Value Ref Range    Color, UA Straw Straw/Yellow    Clarity, UA Clear Clear    Glucose, Ur Negative Negative mg/dL    Bilirubin Urine Negative Negative Ketones, Urine Negative Negative mg/dL    Specific Gravity, UA <=1.005 1.005 - 1.030    Blood, Urine Negative Negative    pH, UA 5.5 5.0 - 8.0    Protein, UA Negative Negative mg/dL    Urobilinogen, Urine 0.2 <2.0 E.U./dL    Nitrite, Urine Negative Negative    Leukocyte Esterase, Urine Negative Negative    Microscopic Examination Not Indicated     Urine Type see below    Pregnancy, Urine   Result Value Ref Range    HCG(Urine) Pregnancy Test Negative Detects HCG level >20 MIU/mL       I estimate there is LOW risk for SUBARACHNOID HEMORRHAGE, MENINGITIS, INTRACRANIAL HEMORRHAGE, SUBDURAL HEMATOMA, OR STROKE, thus I consider the discharge disposition reasonable. Jeannie Isidro and I have discussed the diagnosis and risks, and we agree with discharging home to follow-up with their primary doctor. We also discussed returning to the Emergency Department immediately if new or worsening symptoms occur. We have discussed the symptoms which are most concerning (e.g., changing or worsening pain, weakness, vomiting, fever) that necessitate immediate return. Final Impression    1. Nonintractable headache, unspecified chronicity pattern, unspecified headache type        Discharge Vital Signs:  Blood pressure 107/66, pulse 70, temperature 97.9 °F (36.6 °C), temperature source Oral, resp. rate 16, height 5' 3\" (1.6 m), weight 115 lb (52.2 kg), last menstrual period 08/01/2022, SpO2 100 %, unknown if currently breastfeeding. DISPOSITION  Patient was discharged to home in good condition.          Alejandro Lee PA-C  08/09/22 0088

## 2022-08-06 NOTE — DISCHARGE INSTRUCTIONS
Please return if you have any new, worsening, or concerning symptoms like inability to eat/drink/walk, fevers, numbness, weakness, slurred speech, bad/sudden headache. Contact your primary care physician tomorrow to make a follow up appointment this/next week. Your CT scan showed a possible 1.8mm aneurysm. Talk about further imaging studies you may need of your brain.

## (undated) DEVICE — Device

## (undated) DEVICE — SUTURE MCRYL SZ 0 L36IN ABSRB VLT CT L40MM 1/2 CIR TAPR PNT Y358H

## (undated) DEVICE — DRESSING COMP IS W4XL10IN PD W2XL8IN CNTCT LAYR ADH

## (undated) DEVICE — S/USE RESUS KIT W/O MASK (10): Brand: FISHER & PAYKEL HEALTHCARE

## (undated) DEVICE — SUTURE VCRL SZ 0 L18IN ABSRB VLT L36MM CT-1 1/2 CIR J740D

## (undated) DEVICE — TRAY URIN CATH 16FR DRNGE BG STATLOK STBL DEV F SURSTP

## (undated) DEVICE — PAD,NON-ADHERENT,3X8,STERILE,LF,1/PK: Brand: MEDLINE

## (undated) DEVICE — GLOVE ORANGE PI 8   MSG9080

## (undated) DEVICE — CHLORAPREP 26ML ORANGE

## (undated) DEVICE — SUTURE VCRL SZ 3-0 L36IN ABSRB UD L36MM CT-1 1/2 CIR J944H

## (undated) DEVICE — LARGE, DISPOSABLE ALEXIS O C-SECTION PROTECTOR - RETRACTOR: Brand: ALEXIS ® O C-SECTION PROTECTOR - RETRACTOR

## (undated) DEVICE — GARMENT COMPR L FOR 23IN CALF FLOTRN

## (undated) DEVICE — SOLUTION IV IRRIG POUR BRL 0.9% SODIUM CHL 2F7124

## (undated) DEVICE — SUTURE MAXON 0 L36IN GRN ABSRB GS-24 L40MM 1/2 CIR REINF 8886628761

## (undated) DEVICE — SUTURE VCRL SZ 4-0 L27IN ABSRB UD L60MM KS STR REV CUT NDL J662H

## (undated) DEVICE — 3M™ STERI-STRIP™ COMPOUND BENZOIN TINCTURE 40 BAGS/CARTON 4 CARTONS/CASE C1544: Brand: 3M™ STERI-STRIP™